# Patient Record
Sex: FEMALE | Race: BLACK OR AFRICAN AMERICAN | URBAN - METROPOLITAN AREA
[De-identification: names, ages, dates, MRNs, and addresses within clinical notes are randomized per-mention and may not be internally consistent; named-entity substitution may affect disease eponyms.]

---

## 2024-01-23 ENCOUNTER — OFFICE VISIT (OUTPATIENT)
Age: 44
End: 2024-01-23
Payer: COMMERCIAL

## 2024-01-23 VITALS
OXYGEN SATURATION: 97 % | HEART RATE: 82 BPM | DIASTOLIC BLOOD PRESSURE: 90 MMHG | WEIGHT: 228 LBS | TEMPERATURE: 98.2 F | BODY MASS INDEX: 34.56 KG/M2 | HEIGHT: 68 IN | RESPIRATION RATE: 18 BRPM | SYSTOLIC BLOOD PRESSURE: 145 MMHG

## 2024-01-23 DIAGNOSIS — I10 PRIMARY HYPERTENSION: ICD-10-CM

## 2024-01-23 DIAGNOSIS — Z76.89 ENCOUNTER TO ESTABLISH CARE: Primary | ICD-10-CM

## 2024-01-23 DIAGNOSIS — R35.0 URINARY FREQUENCY: ICD-10-CM

## 2024-01-23 DIAGNOSIS — Z12.31 BREAST CANCER SCREENING BY MAMMOGRAM: ICD-10-CM

## 2024-01-23 LAB
BILIRUBIN, URINE, POC: NEGATIVE
BLOOD URINE, POC: NORMAL
GLUCOSE URINE, POC: NEGATIVE
KETONES, URINE, POC: NEGATIVE
LEUKOCYTE ESTERASE, URINE, POC: NORMAL
NITRITE, URINE, POC: NEGATIVE
PH, URINE, POC: 5.5 (ref 4.6–8)
PROTEIN,URINE, POC: NEGATIVE
SPECIFIC GRAVITY, URINE, POC: 1.01 (ref 1–1.03)
URINALYSIS CLARITY, POC: NORMAL
URINALYSIS COLOR, POC: YELLOW
UROBILINOGEN, POC: NORMAL

## 2024-01-23 PROCEDURE — 3079F DIAST BP 80-89 MM HG: CPT | Performed by: STUDENT IN AN ORGANIZED HEALTH CARE EDUCATION/TRAINING PROGRAM

## 2024-01-23 PROCEDURE — 3077F SYST BP >= 140 MM HG: CPT | Performed by: STUDENT IN AN ORGANIZED HEALTH CARE EDUCATION/TRAINING PROGRAM

## 2024-01-23 PROCEDURE — 99204 OFFICE O/P NEW MOD 45 MIN: CPT

## 2024-01-23 PROCEDURE — 81003 URINALYSIS AUTO W/O SCOPE: CPT

## 2024-01-23 RX ORDER — AMLODIPINE BESYLATE AND BENAZEPRIL HYDROCHLORIDE 10; 20 MG/1; MG/1
1 CAPSULE ORAL DAILY
Qty: 30 CAPSULE | Refills: 3 | Status: SHIPPED | OUTPATIENT
Start: 2024-01-23

## 2024-01-23 RX ORDER — METOPROLOL SUCCINATE 50 MG/1
50 TABLET, EXTENDED RELEASE ORAL
COMMUNITY
Start: 2023-11-15 | End: 2024-02-13

## 2024-01-23 RX ORDER — AMLODIPINE BESYLATE AND BENAZEPRIL HYDROCHLORIDE 5; 10 MG/1; MG/1
1 CAPSULE ORAL 2 TIMES DAILY
COMMUNITY
Start: 2023-11-15 | End: 2024-01-23 | Stop reason: ALTCHOICE

## 2024-01-23 RX ORDER — OMEPRAZOLE 20 MG/1
40 CAPSULE, DELAYED RELEASE ORAL DAILY
COMMUNITY

## 2024-01-23 RX ORDER — TOPIRAMATE 50 MG/1
50 TABLET, FILM COATED ORAL
COMMUNITY
Start: 2023-11-15 | End: 2024-02-13

## 2024-01-23 SDOH — ECONOMIC STABILITY: FOOD INSECURITY: WITHIN THE PAST 12 MONTHS, YOU WORRIED THAT YOUR FOOD WOULD RUN OUT BEFORE YOU GOT MONEY TO BUY MORE.: NEVER TRUE

## 2024-01-23 SDOH — ECONOMIC STABILITY: FOOD INSECURITY: WITHIN THE PAST 12 MONTHS, THE FOOD YOU BOUGHT JUST DIDN'T LAST AND YOU DIDN'T HAVE MONEY TO GET MORE.: NEVER TRUE

## 2024-01-23 SDOH — ECONOMIC STABILITY: HOUSING INSECURITY
IN THE LAST 12 MONTHS, WAS THERE A TIME WHEN YOU DID NOT HAVE A STEADY PLACE TO SLEEP OR SLEPT IN A SHELTER (INCLUDING NOW)?: NO

## 2024-01-23 SDOH — ECONOMIC STABILITY: INCOME INSECURITY: HOW HARD IS IT FOR YOU TO PAY FOR THE VERY BASICS LIKE FOOD, HOUSING, MEDICAL CARE, AND HEATING?: NOT VERY HARD

## 2024-01-23 ASSESSMENT — PATIENT HEALTH QUESTIONNAIRE - PHQ9
SUM OF ALL RESPONSES TO PHQ QUESTIONS 1-9: 10
SUM OF ALL RESPONSES TO PHQ9 QUESTIONS 1 & 2: 3
SUM OF ALL RESPONSES TO PHQ QUESTIONS 1-9: 10
4. FEELING TIRED OR HAVING LITTLE ENERGY: 1
3. TROUBLE FALLING OR STAYING ASLEEP: 0
5. POOR APPETITE OR OVEREATING: 2
1. LITTLE INTEREST OR PLEASURE IN DOING THINGS: 2
2. FEELING DOWN, DEPRESSED OR HOPELESS: 1
SUM OF ALL RESPONSES TO PHQ QUESTIONS 1-9: 10
SUM OF ALL RESPONSES TO PHQ QUESTIONS 1-9: 9
6. FEELING BAD ABOUT YOURSELF - OR THAT YOU ARE A FAILURE OR HAVE LET YOURSELF OR YOUR FAMILY DOWN: 1
9. THOUGHTS THAT YOU WOULD BE BETTER OFF DEAD, OR OF HURTING YOURSELF: 1
10. IF YOU CHECKED OFF ANY PROBLEMS, HOW DIFFICULT HAVE THESE PROBLEMS MADE IT FOR YOU TO DO YOUR WORK, TAKE CARE OF THINGS AT HOME, OR GET ALONG WITH OTHER PEOPLE: 1
7. TROUBLE CONCENTRATING ON THINGS, SUCH AS READING THE NEWSPAPER OR WATCHING TELEVISION: 1
8. MOVING OR SPEAKING SO SLOWLY THAT OTHER PEOPLE COULD HAVE NOTICED. OR THE OPPOSITE, BEING SO FIGETY OR RESTLESS THAT YOU HAVE BEEN MOVING AROUND A LOT MORE THAN USUAL: 1

## 2024-01-23 ASSESSMENT — ANXIETY QUESTIONNAIRES
4. TROUBLE RELAXING: 2
GAD7 TOTAL SCORE: 13
3. WORRYING TOO MUCH ABOUT DIFFERENT THINGS: 2
1. FEELING NERVOUS, ANXIOUS, OR ON EDGE: 2
5. BEING SO RESTLESS THAT IT IS HARD TO SIT STILL: 1
6. BECOMING EASILY ANNOYED OR IRRITABLE: 2
2. NOT BEING ABLE TO STOP OR CONTROL WORRYING: 2
7. FEELING AFRAID AS IF SOMETHING AWFUL MIGHT HAPPEN: 2
IF YOU CHECKED OFF ANY PROBLEMS ON THIS QUESTIONNAIRE, HOW DIFFICULT HAVE THESE PROBLEMS MADE IT FOR YOU TO DO YOUR WORK, TAKE CARE OF THINGS AT HOME, OR GET ALONG WITH OTHER PEOPLE: SOMEWHAT DIFFICULT

## 2024-01-23 NOTE — PROGRESS NOTES
Patient has been identified by name and .    Chief Complaint   Patient presents with    New Patient     Estb with new PCP. Pt reports for HTN, possible UTI & a mole on her right shoulder -getting larger in size & raising.  Pt reports of frequent dark urination, lower ab pain for last few days.       Vitals:    24 1534 24 1544   BP: (!) 152/89 (!) 145/90   Site: Left Upper Arm Left Upper Arm   Position: Sitting Sitting   Cuff Size: Large Adult Large Adult   Pulse: 82    Resp: 18    Temp: 98.2 °F (36.8 °C)    TempSrc: Oral    SpO2: 97%    Weight: 103.4 kg (228 lb)    Height: 1.727 m (5' 8\")         1. Have you been to the ER, urgent care clinic since your last visit?  Hospitalized since your last visit?No    2. Have you seen or consulted any other health care providers outside of the [facility] since your last visit?  Include any pap smears or colon screening. No

## 2024-01-23 NOTE — PROGRESS NOTES
[address] [id number]   Office [phone], Fax [phone]    Subjective:   Jacqui Gonzalez is an 43 y.o. female who presents to establish care.   Currently doing well. Needs refill of BP medicine and is concerned she has a urine infection.   Patient was previously receiving care at: [facility]    #BP  Has cuff at home. Home BPs 130-140s/90-100s.   Not taking medications regularly, sometimes forgets morning dose. Can set an alarm in the morning. Both at night.  Drinking a lot of energy drinks and coffee.   Sometimes has headache/CP.     #Urinary frequency  States her urine has been darker. Taking azo, states that helps with abdominal discomfort that is occurring with urination. No burning with urination.   POC UA: Nitrites neg. Trace LE.     # Establish Care  Medical history significant for:  Hypertension, hx of hypertensive emergency  Depression/anxiety, not on medication currently  Surgical Hx: No  Family Hx: Father - HTN, Mother - HTN, MIx2, depression. Mat grandmother - breast cancer, Paternal GF - prostate cancer.    Diet: Cooking at home mostly. Chicken, some fried foods, fruits/veggies. Good water intake. 1 cup of coffee most days, drinking a lot of mountain dew, no energy drinks.   Exercise: walking lots. 10-15k steps a day.   Tobacco: None  Vape: tried before, but not currently  Alcohol: Occasional. Hard cider / beers multiple times a month. Sporadic.   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   Social Support: Sister lives near. Not many friends. Not really involved in any group activities.    GYN:   Thinks its been more than 2 years since last pap.   One previous mammogram, a few years ago.     Review of Systems   General/Constitutional:   No headache (chronic), fever, fatigue, weight loss or weight gain       Eyes:   No redness, pruritis, pain, visual changes, swelling, or discharge      Ears:    No pain, loss or changes

## 2024-01-31 NOTE — PROGRESS NOTES
[facility] Attending Attestation: While the patient was in clinic or immediately following the patient leaving the clinic, I reviewed the patient's medical history, the resident's findings on physical examination, and the patient's diagnosis and treatment plan with the resident and agree with the documentation in the note.     Eduardo Ramos MD

## 2024-02-22 ENCOUNTER — OFFICE VISIT (OUTPATIENT)
Age: 44
End: 2024-02-22

## 2024-02-22 ENCOUNTER — HOSPITAL ENCOUNTER (OUTPATIENT)
Facility: HOSPITAL | Age: 44
Setting detail: SPECIMEN
Discharge: HOME | End: 2024-02-22
Payer: COMMERCIAL

## 2024-02-22 VITALS
BODY MASS INDEX: 34.25 KG/M2 | DIASTOLIC BLOOD PRESSURE: 86 MMHG | OXYGEN SATURATION: 97 % | TEMPERATURE: 98.4 F | RESPIRATION RATE: 18 BRPM | WEIGHT: 226 LBS | SYSTOLIC BLOOD PRESSURE: 130 MMHG | HEART RATE: 75 BPM | HEIGHT: 68 IN

## 2024-02-22 DIAGNOSIS — Z11.59 NEED FOR HEPATITIS C SCREENING TEST: ICD-10-CM

## 2024-02-22 DIAGNOSIS — L91.8 CUTANEOUS TAG: ICD-10-CM

## 2024-02-22 DIAGNOSIS — E66.09 CLASS 1 OBESITY DUE TO EXCESS CALORIES WITHOUT SERIOUS COMORBIDITY WITH BODY MASS INDEX (BMI) OF 34.0 TO 34.9 IN ADULT: ICD-10-CM

## 2024-02-22 DIAGNOSIS — I10 PRIMARY HYPERTENSION: ICD-10-CM

## 2024-02-22 DIAGNOSIS — Z00.00 ENCOUNTER FOR WELL ADULT EXAM WITHOUT ABNORMAL FINDINGS: Primary | ICD-10-CM

## 2024-02-22 DIAGNOSIS — N92.6 MISSED MENSES: ICD-10-CM

## 2024-02-22 DIAGNOSIS — Z11.4 SCREENING FOR HIV WITHOUT PRESENCE OF RISK FACTORS: ICD-10-CM

## 2024-02-22 DIAGNOSIS — Z12.11 COLON CANCER SCREENING: ICD-10-CM

## 2024-02-22 DIAGNOSIS — Z12.4 CERVICAL CANCER SCREENING: ICD-10-CM

## 2024-02-22 LAB
HCG, PREGNANCY, URINE, POC: NEGATIVE
VALID INTERNAL CONTROL, POC: NORMAL

## 2024-02-22 PROCEDURE — 87661 TRICHOMONAS VAGINALIS AMPLIF: CPT

## 2024-02-22 PROCEDURE — 87624 HPV HI-RISK TYP POOLED RSLT: CPT

## 2024-02-22 PROCEDURE — 87491 CHLMYD TRACH DNA AMP PROBE: CPT

## 2024-02-22 PROCEDURE — 88175 CYTOPATH C/V AUTO FLUID REDO: CPT

## 2024-02-22 PROCEDURE — 87591 N.GONORRHOEAE DNA AMP PROB: CPT

## 2024-02-22 ASSESSMENT — PATIENT HEALTH QUESTIONNAIRE - PHQ9
SUM OF ALL RESPONSES TO PHQ QUESTIONS 1-9: 2
SUM OF ALL RESPONSES TO PHQ QUESTIONS 1-9: 2
2. FEELING DOWN, DEPRESSED OR HOPELESS: 1
SUM OF ALL RESPONSES TO PHQ QUESTIONS 1-9: 2
SUM OF ALL RESPONSES TO PHQ9 QUESTIONS 1 & 2: 2
1. LITTLE INTEREST OR PLEASURE IN DOING THINGS: 1
SUM OF ALL RESPONSES TO PHQ QUESTIONS 1-9: 2

## 2024-02-22 ASSESSMENT — ENCOUNTER SYMPTOMS
ABDOMINAL PAIN: 0
NAUSEA: 0
COUGH: 0
RHINORRHEA: 0
CHEST TIGHTNESS: 0
VOMITING: 0
SORE THROAT: 0
COLOR CHANGE: 0
SHORTNESS OF BREATH: 0
DIARRHEA: 0
BLOOD IN STOOL: 0
WHEEZING: 0

## 2024-02-22 NOTE — PROGRESS NOTES
Patient has been identified by name and .    Chief Complaint   Patient presents with    Annual Exam     Pt reports for a CPE & Pap. Pt reports for a mole on her upper right back proximal to shoulder that was flat but grown in a year, changed color. Bilateral Athlete's feet       Vitals:    24 1301   BP: 130/86   Site: Right Upper Arm   Position: Sitting   Cuff Size: Medium Adult   Pulse: 75   Resp: 18   Temp: 98.4 °F (36.9 °C)   TempSrc: Oral   SpO2: 97%   Weight: 102.5 kg (226 lb)   Height: 1.727 m (5' 8\")        \"Have you been to the ER, urgent care clinic since your last visit?  Hospitalized since your last visit?\"    NO    “Have you seen or consulted any other health care providers outside of [facility] since your last visit?”    NO    “Have you had a colorectal cancer screening such as a colonoscopy/FIT/Cologuard?    NO      “Have you had a pap smear?”    NO

## 2024-02-22 NOTE — PROGRESS NOTES
[address] [place] [address]   Office [phone], Fax [phone]    Subjective:   Jacqui Gonzalez is an 43 y.o. female who presents for complete physical exam.  Doing well. Having the following concerns.    # Concerned about a mole in her back  R upper back; Has been growing for the past year, used to be flat but now is rounded and is catching on her clothes. It is not painful.     #Hypertension  Changed blood pressure medication as advised last visit from BID to once in the evening and is not forgeting her medication as before. Now taking amlodipine-benazepril 10-20mg. BP better controlled. In office, BP today is 130/86.    - Update vaccines, she does not have records with her. Will await from previous PCP.  - Last mammogram more than 5 years ago; Grandmother family history. Referral sent at last visit. Recommend annual screen.   - No previous colonoscopy  - Needs pap today, last unknown    Diet: Doing well. Veggies. Proteins. Good water intake.   Exercise: Walking / running. Trying to start lifting weights.  Tobacco use: None  Alcohol use: Socially  Sexually active: with one partner, . No concerns. Not on birth control. Normally has regular periods. Missed period this past month. January 2nd was last period.    Health Maintenance   Topic Date Due    Hepatitis B vaccine (1 of 3 - 3-dose series) Never done    COVID-19 Vaccine (1) Never done    HIV screen  Never done    Hepatitis C screen  Never done    DTaP/Tdap/Td vaccine (1 - Tdap) Never done    Cervical cancer screen  Never done    Diabetes screen  Never done    Lipids  Never done    Flu vaccine (1) Never done    Colorectal Cancer Screen  Never done    Depression Monitoring  01/23/2025    Hepatitis A vaccine  Aged Out    Hib vaccine  Aged Out    HPV vaccine  Aged Out    Polio vaccine  Aged Out    Meningococcal (ACWY) vaccine  Aged Out    Pneumococcal 0-64 years Vaccine  Aged Out    Depression Screen  Discontinued

## 2024-02-23 LAB
ALBUMIN SERPL-MCNC: 3.8 G/DL (ref 3.5–5)
ALBUMIN/GLOB SERPL: 1 (ref 1.1–2.2)
ALP SERPL-CCNC: 73 U/L (ref 45–117)
ALT SERPL-CCNC: 13 U/L (ref 12–78)
ANION GAP SERPL CALC-SCNC: ABNORMAL MMOL/L (ref 5–15)
AST SERPL-CCNC: 12 U/L (ref 15–37)
BILIRUB SERPL-MCNC: 0.6 MG/DL (ref 0.2–1)
BUN SERPL-MCNC: 14 MG/DL (ref 6–20)
BUN/CREAT SERPL: 15 (ref 12–20)
CALCIUM SERPL-MCNC: 9.4 MG/DL (ref 8.5–10.1)
CHLORIDE SERPL-SCNC: 108 MMOL/L (ref 97–108)
CHOLEST SERPL-MCNC: 183 MG/DL
CO2 SERPL-SCNC: 31 MMOL/L (ref 21–32)
CREAT SERPL-MCNC: 0.96 MG/DL (ref 0.55–1.02)
ERYTHROCYTE [DISTWIDTH] IN BLOOD BY AUTOMATED COUNT: 18.2 % (ref 11.5–14.5)
EST. AVERAGE GLUCOSE BLD GHB EST-MCNC: 103 MG/DL
GLOBULIN SER CALC-MCNC: 3.9 G/DL (ref 2–4)
GLUCOSE SERPL-MCNC: 84 MG/DL (ref 65–100)
HBA1C MFR BLD: 5.2 % (ref 4–5.6)
HCT VFR BLD AUTO: 36.4 % (ref 35–47)
HCV AB SER IA-ACNC: 0.14 INDEX
HCV AB SERPL QL IA: NONREACTIVE
HDLC SERPL-MCNC: 55 MG/DL
HDLC SERPL: 3.3 (ref 0–5)
HGB BLD-MCNC: 11.4 G/DL (ref 11.5–16)
HIV 1+2 AB+HIV1 P24 AG SERPL QL IA: NONREACTIVE
HIV 1/2 RESULT COMMENT: NORMAL
LDLC SERPL CALC-MCNC: 105.6 MG/DL (ref 0–100)
MCH RBC QN AUTO: 23.6 PG (ref 26–34)
MCHC RBC AUTO-ENTMCNC: 31.3 G/DL (ref 30–36.5)
MCV RBC AUTO: 75.4 FL (ref 80–99)
NRBC # BLD: 0 K/UL (ref 0–0.01)
NRBC BLD-RTO: 0 PER 100 WBC
PLATELET # BLD AUTO: 296 K/UL (ref 150–400)
PMV BLD AUTO: 10.9 FL (ref 8.9–12.9)
POTASSIUM SERPL-SCNC: 4 MMOL/L (ref 3.5–5.1)
PROT SERPL-MCNC: 7.7 G/DL (ref 6.4–8.2)
RBC # BLD AUTO: 4.83 M/UL (ref 3.8–5.2)
SODIUM SERPL-SCNC: 138 MMOL/L (ref 136–145)
TRIGL SERPL-MCNC: 112 MG/DL
VLDLC SERPL CALC-MCNC: 22.4 MG/DL
WBC # BLD AUTO: 6.9 K/UL (ref 3.6–11)

## 2024-02-26 LAB
C TRACH RRNA SPEC QL NAA+PROBE: NEGATIVE
N GONORRHOEA RRNA SPEC QL NAA+PROBE: NEGATIVE
SPECIMEN SOURCE: NORMAL
T VAGINALIS RRNA SPEC QL NAA+PROBE: NEGATIVE

## 2024-03-01 ENCOUNTER — NURSE ONLY (OUTPATIENT)
Age: 44
End: 2024-03-01

## 2024-03-01 DIAGNOSIS — Z00.00 ENCOUNTER FOR WELL ADULT EXAM WITHOUT ABNORMAL FINDINGS: ICD-10-CM

## 2024-03-02 LAB
FERRITIN SERPL-MCNC: 8 NG/ML (ref 26–388)
IRON SATN MFR SERPL: 7 % (ref 20–50)
IRON SERPL-MCNC: 27 UG/DL (ref 35–150)
TIBC SERPL-MCNC: 405 UG/DL (ref 250–450)

## 2024-03-05 ENCOUNTER — TELEPHONE (OUTPATIENT)
Age: 44
End: 2024-03-05

## 2024-03-05 DIAGNOSIS — D50.9 IRON DEFICIENCY ANEMIA, UNSPECIFIED IRON DEFICIENCY ANEMIA TYPE: Primary | ICD-10-CM

## 2024-03-05 RX ORDER — FERROUS SULFATE 325(65) MG
325 TABLET ORAL EVERY OTHER DAY
Qty: 90 TABLET | Refills: 1 | Status: SHIPPED | OUTPATIENT
Start: 2024-03-05

## 2024-03-05 NOTE — TELEPHONE ENCOUNTER
Called patient ~5:40PM. Confirmed patient by name and date of birth. Explained recent labs that are consistent with iron deficiency anemia. Hgb 11.4. Fe 27. Ferritin 8. Discussed incorporating more foods high in iron (including leafy greens, veggies, meats and other proteins) into the diet. Will also start on iron supplementation with iron sulfate 325mg every other day with breakfast. Advised on potential side effects including constipation.

## 2024-05-06 NOTE — TELEPHONE ENCOUNTER
Hi Dr. Infante,    Patient is completed out of medication and requesting a refill.    metoprolol succinate (TOPROL XL) 50 MG extended release tablet     Please send to:    [facility]/pharmacy #[id number] - [place], [place] - [address] - P [phone] - F [phone]  [address] [place] [address]  Phone: [phone]  Fax: [phone]

## 2024-05-06 NOTE — TELEPHONE ENCOUNTER
Medication Refill Request    Jacqui Gonzalez is requesting a refill of the following medication(s):   Requested Prescriptions     Pending Prescriptions Disp Refills    metoprolol succinate (TOPROL XL) 50 MG extended release tablet 30 tablet      Sig: Take 1 tablet by mouth        Listed PCP is Silvia Montgomery MD   Last provider to prescribe medication: Historical  Last Date of Medication Prescribed: 11/15/2023   Date of Last Office Visit at [facility]: 02/22/2024     FUTURE APPOINTMENT: No future appointments.    Please send refill to:    [facility]/pharmacy #[id number] - [place], [place] - [address] - P [phone] - F [phone]  [address] [place] [address]  Phone: [phone] Fax: [phone]      Please review request and approve or deny with recommendations.

## 2024-05-09 RX ORDER — METOPROLOL SUCCINATE 50 MG/1
50 TABLET, EXTENDED RELEASE ORAL DAILY
Qty: 30 TABLET | Refills: 0 | Status: SHIPPED | OUTPATIENT
Start: 2024-05-09 | End: 2024-06-08

## 2024-05-09 NOTE — TELEPHONE ENCOUNTER
Pt requests refill of metoprolol 50mg daily which she has been on for years, prescribed by previous PCP. Pt believes it is for treatment of her blood pressure but is unsure. Reports chest pain and palpitations in the past and seen by cardiology. Per pt, stress test was normal. Pt reports chest pain intermittently occurring for the past week while she has been off the medication. I recommended evaluation in the ER if chest pain occurs. I will send 1 month supply of this medication as she has been on it chronically and encouraged follow up in clinic in the next month.    Silvia Infante MD  05/09/24

## 2024-08-21 DIAGNOSIS — I10 PRIMARY HYPERTENSION: ICD-10-CM

## 2024-08-22 NOTE — TELEPHONE ENCOUNTER
Medication Refill Request    Jacqui Gonzalez is requesting a refill of the following medication(s):   Requested Prescriptions     Pending Prescriptions Disp Refills    amLODIPine-benazepril (LOTREL) 10-20 MG per capsule 30 capsule 3     Sig: Take 1 capsule by mouth daily        Listed PCP is Silvia Montgomery MD   Last provider to prescribe medication: Rachel  Last Date of Medication Prescribed: 1/23/2024   Date of Last Office Visit at [facility]: 1/23/2024   FUTURE APPOINTMENT: No future appointments.    Please send refill to:    [facility]/pharmacy #[id number] - [place], [place] - [id number] [place] [address] - P [phone] - F [phone]  [address] [place] [address]  Phone: [phone] Fax: [phone]      Please review request and approve or deny with recommendations.

## 2024-08-26 RX ORDER — AMLODIPINE AND BENAZEPRIL HYDROCHLORIDE 10; 20 MG/1; MG/1
1 CAPSULE ORAL DAILY
Qty: 30 CAPSULE | Refills: 3 | Status: SHIPPED | OUTPATIENT
Start: 2024-08-26

## 2024-11-05 ENCOUNTER — TELEPHONE (OUTPATIENT)
Age: 44
End: 2024-11-05

## 2025-01-29 ENCOUNTER — OFFICE VISIT (OUTPATIENT)
Age: 45
End: 2025-01-29
Payer: COMMERCIAL

## 2025-01-29 VITALS
TEMPERATURE: 97.6 F | DIASTOLIC BLOOD PRESSURE: 79 MMHG | HEART RATE: 89 BPM | RESPIRATION RATE: 18 BRPM | WEIGHT: 237 LBS | SYSTOLIC BLOOD PRESSURE: 128 MMHG | HEIGHT: 68 IN | BODY MASS INDEX: 35.92 KG/M2 | OXYGEN SATURATION: 97 %

## 2025-01-29 DIAGNOSIS — I10 PRIMARY HYPERTENSION: ICD-10-CM

## 2025-01-29 DIAGNOSIS — F41.9 SEVERE ANXIETY: Primary | ICD-10-CM

## 2025-01-29 DIAGNOSIS — F32.A MILD DEPRESSION: ICD-10-CM

## 2025-01-29 DIAGNOSIS — Z12.31 BREAST CANCER SCREENING BY MAMMOGRAM: ICD-10-CM

## 2025-01-29 DIAGNOSIS — Z12.11 COLON CANCER SCREENING: ICD-10-CM

## 2025-01-29 PROCEDURE — 3074F SYST BP LT 130 MM HG: CPT

## 2025-01-29 PROCEDURE — 3078F DIAST BP <80 MM HG: CPT

## 2025-01-29 PROCEDURE — 99214 OFFICE O/P EST MOD 30 MIN: CPT

## 2025-01-29 RX ORDER — SERTRALINE HYDROCHLORIDE 25 MG/1
25 TABLET, FILM COATED ORAL DAILY
Qty: 30 TABLET | Refills: 0 | Status: SHIPPED | OUTPATIENT
Start: 2025-01-29

## 2025-01-29 ASSESSMENT — PATIENT HEALTH QUESTIONNAIRE - PHQ9
3. TROUBLE FALLING OR STAYING ASLEEP: MORE THAN HALF THE DAYS
SUM OF ALL RESPONSES TO PHQ QUESTIONS 1-9: 8
8. MOVING OR SPEAKING SO SLOWLY THAT OTHER PEOPLE COULD HAVE NOTICED. OR THE OPPOSITE, BEING SO FIGETY OR RESTLESS THAT YOU HAVE BEEN MOVING AROUND A LOT MORE THAN USUAL: NOT AT ALL
10. IF YOU CHECKED OFF ANY PROBLEMS, HOW DIFFICULT HAVE THESE PROBLEMS MADE IT FOR YOU TO DO YOUR WORK, TAKE CARE OF THINGS AT HOME, OR GET ALONG WITH OTHER PEOPLE: SOMEWHAT DIFFICULT
SUM OF ALL RESPONSES TO PHQ QUESTIONS 1-9: 8
5. POOR APPETITE OR OVEREATING: NOT AT ALL
6. FEELING BAD ABOUT YOURSELF - OR THAT YOU ARE A FAILURE OR HAVE LET YOURSELF OR YOUR FAMILY DOWN: SEVERAL DAYS
9. THOUGHTS THAT YOU WOULD BE BETTER OFF DEAD, OR OF HURTING YOURSELF: NOT AT ALL
4. FEELING TIRED OR HAVING LITTLE ENERGY: SEVERAL DAYS
SUM OF ALL RESPONSES TO PHQ9 QUESTIONS 1 & 2: 2
SUM OF ALL RESPONSES TO PHQ QUESTIONS 1-9: 8
1. LITTLE INTEREST OR PLEASURE IN DOING THINGS: SEVERAL DAYS
7. TROUBLE CONCENTRATING ON THINGS, SUCH AS READING THE NEWSPAPER OR WATCHING TELEVISION: MORE THAN HALF THE DAYS
2. FEELING DOWN, DEPRESSED OR HOPELESS: SEVERAL DAYS
SUM OF ALL RESPONSES TO PHQ QUESTIONS 1-9: 8

## 2025-01-29 ASSESSMENT — ANXIETY QUESTIONNAIRES
GAD7 TOTAL SCORE: 15
4. TROUBLE RELAXING: MORE THAN HALF THE DAYS
2. NOT BEING ABLE TO STOP OR CONTROL WORRYING: MORE THAN HALF THE DAYS
6. BECOMING EASILY ANNOYED OR IRRITABLE: MORE THAN HALF THE DAYS
3. WORRYING TOO MUCH ABOUT DIFFERENT THINGS: MORE THAN HALF THE DAYS
7. FEELING AFRAID AS IF SOMETHING AWFUL MIGHT HAPPEN: NEARLY EVERY DAY
1. FEELING NERVOUS, ANXIOUS, OR ON EDGE: NEARLY EVERY DAY
IF YOU CHECKED OFF ANY PROBLEMS ON THIS QUESTIONNAIRE, HOW DIFFICULT HAVE THESE PROBLEMS MADE IT FOR YOU TO DO YOUR WORK, TAKE CARE OF THINGS AT HOME, OR GET ALONG WITH OTHER PEOPLE: SOMEWHAT DIFFICULT
5. BEING SO RESTLESS THAT IT IS HARD TO SIT STILL: SEVERAL DAYS

## 2025-01-29 NOTE — PROGRESS NOTES
Patient has been identified by name and .    Chief Complaint   Patient presents with    Anxiety     Pt reports with anxiety issues, building over couple of months        Vitals:    25 1729 25 1734   BP: (!) 144/88 128/79   Site: Left Upper Arm Right Upper Arm   Position: Sitting Sitting   Cuff Size: Large Adult Large Adult   Pulse: 93 89   Resp: 18    Temp: 97.6 °F (36.4 °C)    TempSrc: Oral    SpO2: 97%    Weight: 107.5 kg (237 lb)    Height: 1.727 m (5' 8\")         \"Have you been to the ER, urgent care clinic since your last visit?  Hospitalized since your last visit?\"    NO    “Have you seen or consulted any other health care providers outside of [facility] since your last visit?”    NO    “Have you had a colorectal cancer screening such as a colonoscopy/FIT/Cologuard?    NO    No colonoscopy on file  No cologuard on file  No FIT/FOBT on file   No flexible sigmoidoscopy on file        Have you had a mammogram?”   NO    No breast cancer screening on file

## 2025-01-29 NOTE — PROGRESS NOTES
[address] [place] [address]   Office [phone], Fax [phone]    Subjective   Jacqui Gonzalez is a 44 y.o. 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 No SOB/chest pain/palpitations/flushing/tremor.  Worried - constantly worrying about her sons (calling all the time), worried something bad will happen, her work place, no longer calm   No tobacco use. Occasional alcohol use - one beer or hard cider, 1-2x / month  Previously was on an antidepressant, in 2010 after her dad passed away. Discontinued after 1 year.  Supports: very little. Mother / sister, but they're often busy. Doesn't talk with  about this.    Denies feeling distractible, impulsive, flight of ideas, increased activity/speech.    #HTN  BP today: 128/79  Taking amlodipine-benazepril 10-20mg daily.  Doing well.   No headache, dizziness, visual changes, chest pain, SOB, N/V, abdominal pain    Medical History  Past Medical History:   Diagnosis Date    Anxiety     Hypertension        Medications  Current Outpatient Medications   Medication Sig    amLODIPine-benazepril (LOTREL) 10-20 MG per capsule Take 1 capsule by mouth daily    metoprolol succinate (TOPROL XL) 50 MG extended release tablet Take 1 tablet by mouth daily (Patient not taking: Reported on 1/29/2025)    ferrous sulfate (IRON 325) 325 (65 Fe) MG tablet Take 1 tablet by mouth every other day with breakfast (Patient not taking: Reported on 1/29/2025)    topiramate (TOPAMAX) 50 MG tablet Take 1 tablet by mouth (Patient not taking: Reported on 1/29/2025)    omeprazole (PRILOSEC) 20

## 2025-01-29 NOTE — PATIENT INSTRUCTIONS
Advised to make the following lifestyle modifications:  - Diet: Increase daily intake of vegetables, fruit, and seeds/nuts. Eat fish 2-3 times per week. Avoid sugary snacks and drinks. Eat at regular intervals and do not skip meals. Avoid salty dinners.   - Exercise: Walk at least 1 hour per day at a pace that is comfortable for you. Stretch your muscles.  - Light exposure: Spend time in the sun / bright area. Take precautions to avoid sunburn - early hours of the day are best.   - Sleep: Have a set bedtime and wake up time. Stay consistent. Get at least 8 hours of sleep. Avoid caffeine in afternoon. Lavender and camomile can help with sleep. Have a bedtime routine - this may include warm shower, brushing teeth, stretching, listening to music, preparing for the next day, prayer/meditation/reflection, etc. Do the same thing, in the same order every night. Sleep in quiet, dark, cool room. Avoid TV/phone screens for 30mins prior to sleep. Do not nap during the day. Only use bed for sleep - do not do work in bed.       Are you having trouble sleeping?    Check out these TIPs for getting a better nights sleep        https://youtu.be/aPLLurcUF8T      24 Hour National Suicide Hotline  1-792.546.2847    [facility] Board:  24 hour crisis line: 103.143.3878  Daytime number: [phone]  Psychiatry, counseling, case management, drug/alcohol addiction    [facility]  Intake: [phone]  Emergency: [phone]    [facility]    [facility]  [address] [place] [address]  Phone: [phone]     [facility] (Dr. Olguin): [email or web address]  [address], [place] [address]  Phone: [phone]  Fax: [phone]  Office Hours:  Mon: 10:00 am to 4:00 pm  Tue: 8:00 am to 6:00 pm  Wed-Thur: 8:00 am to 7:00 pm     [facility]: [email or web address]  [place]

## 2025-02-17 ENCOUNTER — OFFICE VISIT (OUTPATIENT)
Age: 45
End: 2025-02-17
Payer: COMMERCIAL

## 2025-02-17 VITALS
HEIGHT: 68 IN | HEART RATE: 85 BPM | TEMPERATURE: 98.2 F | OXYGEN SATURATION: 96 % | SYSTOLIC BLOOD PRESSURE: 112 MMHG | RESPIRATION RATE: 20 BRPM | WEIGHT: 229.6 LBS | DIASTOLIC BLOOD PRESSURE: 77 MMHG | BODY MASS INDEX: 34.8 KG/M2

## 2025-02-17 DIAGNOSIS — Z12.11 SCREENING FOR COLON CANCER: ICD-10-CM

## 2025-02-17 DIAGNOSIS — Z12.31 ENCOUNTER FOR SCREENING MAMMOGRAM FOR MALIGNANT NEOPLASM OF BREAST: ICD-10-CM

## 2025-02-17 DIAGNOSIS — F41.1 GAD (GENERALIZED ANXIETY DISORDER): Primary | ICD-10-CM

## 2025-02-17 PROCEDURE — 99213 OFFICE O/P EST LOW 20 MIN: CPT

## 2025-02-17 RX ORDER — SERTRALINE HYDROCHLORIDE 25 MG/1
25 TABLET, FILM COATED ORAL DAILY
Qty: 30 TABLET | Refills: 2 | Status: SHIPPED | OUTPATIENT
Start: 2025-02-17

## 2025-02-17 SDOH — ECONOMIC STABILITY: TRANSPORTATION INSECURITY
IN THE PAST 12 MONTHS, HAS THE LACK OF TRANSPORTATION KEPT YOU FROM MEDICAL APPOINTMENTS OR FROM GETTING MEDICATIONS?: NO

## 2025-02-17 SDOH — ECONOMIC STABILITY: TRANSPORTATION INSECURITY
IN THE PAST 12 MONTHS, HAS LACK OF TRANSPORTATION KEPT YOU FROM MEETINGS, WORK, OR FROM GETTING THINGS NEEDED FOR DAILY LIVING?: NO

## 2025-02-17 SDOH — ECONOMIC STABILITY: INCOME INSECURITY: IN THE LAST 12 MONTHS, WAS THERE A TIME WHEN YOU WERE NOT ABLE TO PAY THE MORTGAGE OR RENT ON TIME?: NO

## 2025-02-17 SDOH — ECONOMIC STABILITY: FOOD INSECURITY: WITHIN THE PAST 12 MONTHS, THE FOOD YOU BOUGHT JUST DIDN'T LAST AND YOU DIDN'T HAVE MONEY TO GET MORE.: NEVER TRUE

## 2025-02-17 SDOH — ECONOMIC STABILITY: FOOD INSECURITY: WITHIN THE PAST 12 MONTHS, YOU WORRIED THAT YOUR FOOD WOULD RUN OUT BEFORE YOU GOT MONEY TO BUY MORE.: NEVER TRUE

## 2025-02-17 ASSESSMENT — ANXIETY QUESTIONNAIRES
7. FEELING AFRAID AS IF SOMETHING AWFUL MIGHT HAPPEN: NOT AT ALL
4. TROUBLE RELAXING: NOT AT ALL
IF YOU CHECKED OFF ANY PROBLEMS ON THIS QUESTIONNAIRE, HOW DIFFICULT HAVE THESE PROBLEMS MADE IT FOR YOU TO DO YOUR WORK, TAKE CARE OF THINGS AT HOME, OR GET ALONG WITH OTHER PEOPLE: NOT DIFFICULT AT ALL
1. FEELING NERVOUS, ANXIOUS, OR ON EDGE: NOT AT ALL
GAD7 TOTAL SCORE: 1
5. BEING SO RESTLESS THAT IT IS HARD TO SIT STILL: NOT AT ALL
6. BECOMING EASILY ANNOYED OR IRRITABLE: SEVERAL DAYS
3. WORRYING TOO MUCH ABOUT DIFFERENT THINGS: NOT AT ALL
2. NOT BEING ABLE TO STOP OR CONTROL WORRYING: NOT AT ALL

## 2025-02-17 ASSESSMENT — PATIENT HEALTH QUESTIONNAIRE - PHQ9
7. TROUBLE CONCENTRATING ON THINGS, SUCH AS READING THE NEWSPAPER OR WATCHING TELEVISION: SEVERAL DAYS
SUM OF ALL RESPONSES TO PHQ QUESTIONS 1-9: 2
1. LITTLE INTEREST OR PLEASURE IN DOING THINGS: NOT AT ALL
9. THOUGHTS THAT YOU WOULD BE BETTER OFF DEAD, OR OF HURTING YOURSELF: NOT AT ALL
8. MOVING OR SPEAKING SO SLOWLY THAT OTHER PEOPLE COULD HAVE NOTICED. OR THE OPPOSITE, BEING SO FIGETY OR RESTLESS THAT YOU HAVE BEEN MOVING AROUND A LOT MORE THAN USUAL: NOT AT ALL
SUM OF ALL RESPONSES TO PHQ QUESTIONS 1-9: 2
SUM OF ALL RESPONSES TO PHQ QUESTIONS 1-9: 2
4. FEELING TIRED OR HAVING LITTLE ENERGY: NOT AT ALL
6. FEELING BAD ABOUT YOURSELF - OR THAT YOU ARE A FAILURE OR HAVE LET YOURSELF OR YOUR FAMILY DOWN: NOT AT ALL
2. FEELING DOWN, DEPRESSED OR HOPELESS: NOT AT ALL
3. TROUBLE FALLING OR STAYING ASLEEP: SEVERAL DAYS
SUM OF ALL RESPONSES TO PHQ QUESTIONS 1-9: 2
10. IF YOU CHECKED OFF ANY PROBLEMS, HOW DIFFICULT HAVE THESE PROBLEMS MADE IT FOR YOU TO DO YOUR WORK, TAKE CARE OF THINGS AT HOME, OR GET ALONG WITH OTHER PEOPLE: NOT DIFFICULT AT ALL
5. POOR APPETITE OR OVEREATING: NOT AT ALL
SUM OF ALL RESPONSES TO PHQ9 QUESTIONS 1 & 2: 0

## 2025-02-17 ASSESSMENT — ENCOUNTER SYMPTOMS
NAUSEA: 0
VOMITING: 0
DIARRHEA: 0

## 2025-02-17 NOTE — PROGRESS NOTES
[address] [place] [address]   Office [phone], Fax [phone]    Subjective   Jacqui Gonzalez is a 44 y.o. female who presents for Depression and Anxiety    #Anxiety follow up  - started on sertraline 25mg four weeks ago  - side effect of dry mouth but otherwise no issues; no GI/neuro sx.  - doesn't feel like much has changed in her life in terms of stressors  - stressors: work/hiring, family, news, recent storms; same as before but she is not worrying as much  - unsure what coping mechanisms have been helping  - has been eating more protein and fewer carbs  - listening to ebooks  - going out to eat with family, spending more time with kids  - not getting to the gym much due to work  - ANTHONY-7 score 1, PHQ-9 score 2  - No SI/HI  - [facility] appointment tomorrow    #low iron  - not taking supplement   - is taking a multivitamin but unsure how much iron in this  - open to increasing in diet    #allergies  - uses claritin typically, not using at this time  - also uses flonase     #HM  - CSY  - mammo  - flu, tdap; declined today    Review of Systems   Review of Systems   Constitutional:  Negative for activity change and appetite change.   Eyes:  Negative for visual disturbance.   Cardiovascular:  Negative for chest pain and palpitations.   Gastrointestinal:  Negative for diarrhea, nausea and vomiting.   Skin:  Negative for rash.   Neurological:  Negative for dizziness, syncope, weakness, light-headedness and numbness.   Psychiatric/Behavioral:  Negative for agitation, dysphoric mood, sleep disturbance and suicidal ideas. The patient is not nervous/anxious and is not hyperactive.       Medical History  Past Medical History:   Diagnosis Date    Anxiety     Hypertension        Medications  Current Outpatient Medications   Medication Sig    sertraline (ZOLOFT) 25 MG tablet Take 1 tablet by mouth daily    amLODIPine-benazepril (LOTREL) 10-20 MG per capsule Take 1 capsule by mouth daily     No current  Clothing

## 2025-02-17 NOTE — PROGRESS NOTES
Identified pt with two pt identifiers(name and ). Reviewed record in preparation for visit and have obtained necessary documentation.  Chief Complaint   Patient presents with    Depression    Anxiety        Health Maintenance Due   Topic    Hepatitis B vaccine (1 of 3 - 19+ 3-dose series)    DTaP/Tdap/Td vaccine (1 - Tdap)    Breast cancer screen     Colorectal Cancer Screen     Flu vaccine (1)    COVID-19 Vaccine (3 - 2024-25 season)       Vitals:    25 0834   BP: 112/77   Site: Right Upper Arm   Position: Sitting   Cuff Size: Large Adult   Pulse: 85   Resp: 20   Temp: 98.2 °F (36.8 °C)   TempSrc: Oral   SpO2: 96%   Weight: 104.1 kg (229 lb 9.6 oz)   Height: 1.727 m (5' 8\")         \"Have you been to the ER, urgent care clinic since your last visit?  Hospitalized since your last visit?\"    NO    “Have you seen or consulted any other health care providers outside of [facility] since your last visit?”    NO    Have you had a mammogram?”   NO    No breast cancer screening on file         “Have you had a colorectal cancer screening such as a colonoscopy/FIT/Cologuard?    NO    No colonoscopy on file  No cologuard on file  No FIT/FOBT on file   No flexible sigmoidoscopy on file         Click Here for Release of Records Request     This patient is accompanied in the office by her self.  I have received verbal consent from Jacqui Gonzalez to discuss any/all medical information while they are present in the room.

## 2025-02-18 ENCOUNTER — OFFICE VISIT (OUTPATIENT)
Age: 45
End: 2025-02-18
Payer: COMMERCIAL

## 2025-02-18 DIAGNOSIS — F43.22 ADJUSTMENT DISORDER WITH ANXIOUS MOOD: ICD-10-CM

## 2025-02-18 DIAGNOSIS — Z71.9 ENCOUNTER FOR COUNSELING: ICD-10-CM

## 2025-02-18 DIAGNOSIS — F41.1 GAD (GENERALIZED ANXIETY DISORDER): Primary | ICD-10-CM

## 2025-02-18 PROCEDURE — 99213 OFFICE O/P EST LOW 20 MIN: CPT

## 2025-02-18 NOTE — PROGRESS NOTES
[facility] Attending Attestation: While the patient was in clinic or immediately following the patient leaving the clinic, I reviewed the patient's medical history, the resident's findings on physical examination, and the patient's diagnosis and treatment plan with the resident and agree with the documentation in the note.     Eduardo Ramos MD

## 2025-02-18 NOTE — PROGRESS NOTES
[address] [id number]   Office [phone], Fax [phone]     Jacqui Gonzalez is a 44 y.o. female who presents to the [facility] for counseling. Referred by Dr. Infante.     -Mental health history: anxiety     -HPI: Pt has a history of anxiety. Increased stressors as below. Feeling lots of somatic sxs-heart pounding, stomach upset, etc. Reports feels \"stuck\" at times because of anxiety. Feeling more anger recently.    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 Getting called and having to do work even on days off. Feeling burned out.    -Strategies pt has tried in an attempt to control stress:   - Reports she tries to \"be quiet\" when she feels angry.  - Making a to-do list  - talks to mom, good support for her    -Medications for anxiety and depression: zoloft 25mg    Current Outpatient Medications   Medication Sig Dispense Refill    sertraline (ZOLOFT) 25 MG tablet Take 1 tablet by mouth daily 30 tablet 2    amLODIPine-benazepril (LOTREL) 10-20 MG per capsule Take 1 capsule by mouth daily 30 capsule 3     No current facility-administered medications for this visit.     No Known Allergies  Past Medical History:   Diagnosis Date    Anxiety     Hypertension      Family History   Problem Relation Age of Onset    Heart Attack Mother     Hypertension Mother     Depression Mother     Lung Disease Father     Hypertension Father     High Blood Pressure Father     Breast Cancer Maternal Grandmother      Social History     Socioeconomic History    Marital status: Single     Spouse name: Not on file    Number of children: Not on file    Years of education: Not on file    Highest education level: Not on file   Occupational History    Not on file   Tobacco Use    Smoking status: Never    Smokeless tobacco: Never   Vaping Use    Vaping status: Never Used   Substance and Sexual Activity    Alcohol use: Yes     Alcohol/week: 1.0 standard

## 2025-03-12 DIAGNOSIS — I10 PRIMARY HYPERTENSION: ICD-10-CM

## 2025-03-12 NOTE — TELEPHONE ENCOUNTER
Medication Refill Request    Jacqui Gonzalez is requesting a refill of the following medication(s):   Requested Prescriptions     Pending Prescriptions Disp Refills    amLODIPine-benazepril (LOTREL) 10-20 MG per capsule 30 capsule 3     Sig: Take 1 capsule by mouth daily        Listed PCP is Silvia Montgomery MD   Last provider to prescribe medication: Dr. Infante  Last Date of Medication Prescribed: 08/26/24   Date of Last Office Visit at [facility]: 02/18/25   FUTURE APPOINTMENT:   Future Appointments   Date Time Provider Department Center   5/15/2025  8:00 AM Silvia Montgomery MD [facility] DEP       Please send refill to:    [facility]/pharmacy #[id number] - [place], [place] - [address] - P [phone] - F [phone]  [address] [place] [address]  Phone: [phone] Fax: [phone]      Please review request and approve or deny with recommendations.

## 2025-03-13 RX ORDER — AMLODIPINE AND BENAZEPRIL HYDROCHLORIDE 10; 20 MG/1; MG/1
1 CAPSULE ORAL DAILY
Qty: 30 CAPSULE | Refills: 3 | Status: SHIPPED | OUTPATIENT
Start: 2025-03-13

## 2025-04-30 ENCOUNTER — OFFICE VISIT (OUTPATIENT)
Age: 45
End: 2025-04-30

## 2025-04-30 VITALS
DIASTOLIC BLOOD PRESSURE: 74 MMHG | HEART RATE: 78 BPM | HEIGHT: 68 IN | OXYGEN SATURATION: 95 % | RESPIRATION RATE: 18 BRPM | BODY MASS INDEX: 35.01 KG/M2 | WEIGHT: 231 LBS | SYSTOLIC BLOOD PRESSURE: 111 MMHG | TEMPERATURE: 98.3 F

## 2025-04-30 DIAGNOSIS — R06.83 SNORING: ICD-10-CM

## 2025-04-30 DIAGNOSIS — B35.3 TINEA PEDIS OF BOTH FEET: ICD-10-CM

## 2025-04-30 DIAGNOSIS — D50.8 IRON DEFICIENCY ANEMIA SECONDARY TO INADEQUATE DIETARY IRON INTAKE: ICD-10-CM

## 2025-04-30 DIAGNOSIS — E66.812 OBESITY, CLASS II, BMI 35-39.9: ICD-10-CM

## 2025-04-30 DIAGNOSIS — Z00.00 ENCOUNTER FOR WELL ADULT EXAM WITHOUT ABNORMAL FINDINGS: ICD-10-CM

## 2025-04-30 DIAGNOSIS — B35.1 ONYCHOMYCOSIS: ICD-10-CM

## 2025-04-30 DIAGNOSIS — I10 PRIMARY HYPERTENSION: ICD-10-CM

## 2025-04-30 DIAGNOSIS — F41.9 ANXIETY: ICD-10-CM

## 2025-04-30 DIAGNOSIS — Z12.11 COLON CANCER SCREENING: ICD-10-CM

## 2025-04-30 DIAGNOSIS — Z12.31 ENCOUNTER FOR SCREENING MAMMOGRAM FOR MALIGNANT NEOPLASM OF BREAST: Primary | ICD-10-CM

## 2025-04-30 RX ORDER — TERBINAFINE HYDROCHLORIDE 250 MG/1
250 TABLET ORAL DAILY
Qty: 84 TABLET | Refills: 0 | Status: SHIPPED | OUTPATIENT
Start: 2025-04-30 | End: 2025-07-23

## 2025-04-30 RX ORDER — AMLODIPINE AND BENAZEPRIL HYDROCHLORIDE 10; 20 MG/1; MG/1
1 CAPSULE ORAL DAILY
Qty: 90 CAPSULE | Refills: 1 | Status: SHIPPED | OUTPATIENT
Start: 2025-04-30

## 2025-04-30 RX ORDER — ACETAMINOPHEN 500 MG
500 TABLET ORAL EVERY 6 HOURS PRN
COMMUNITY

## 2025-04-30 ASSESSMENT — PATIENT HEALTH QUESTIONNAIRE - PHQ9
2. FEELING DOWN, DEPRESSED OR HOPELESS: SEVERAL DAYS
SUM OF ALL RESPONSES TO PHQ QUESTIONS 1-9: 2
SUM OF ALL RESPONSES TO PHQ QUESTIONS 1-9: 2
1. LITTLE INTEREST OR PLEASURE IN DOING THINGS: SEVERAL DAYS
SUM OF ALL RESPONSES TO PHQ QUESTIONS 1-9: 2
SUM OF ALL RESPONSES TO PHQ QUESTIONS 1-9: 2

## 2025-04-30 NOTE — PROGRESS NOTES
Identified pt with two pt identifiers(name and ). Reviewed record in preparation for visit and have obtained necessary documentation.  Chief Complaint   Patient presents with    Annual Exam   Not fasting. Had coffee with cream this morning, but food. She also states she has been having chest pain to the center of the chest, that doesn't radiate. She also reports upper back pain. She states the chest pain and back pain started a week ago. 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  She states the chest pain has gotten better in the last day.     Health Maintenance Due   Topic    Hepatitis B vaccine (1 of 3 - 19+ 3-dose series)    DTaP/Tdap/Td vaccine (1 - Tdap)    Breast cancer screen     Colorectal Cancer Screen     COVID-19 Vaccine (3 - 2024-25 season)       Vitals:    25 0916   BP: 111/74   BP Site: Right Upper Arm   Patient Position: Sitting   BP Cuff Size: Large Adult   Pulse: 78   Resp: 18   Temp: 98.3 °F (36.8 °C)   TempSrc: Oral   SpO2: 95%   Weight: 104.8 kg (231 lb)   Height: 1.727 m (5' 8\")         \"Have you been to the ER, urgent care clinic since your last visit?  Hospitalized since your last visit?\"    Yes, 2024 [facility] ER Chest pain, headache     “Have you seen or consulted any other health care providers outside of [facility] since your last visit?”    NO    Have you had a mammogram?”   NO    No breast cancer screening on file         “Have you had a colorectal cancer screening such as a colonoscopy/FIT/Cologuard?    NO    No colonoscopy on file  No cologuard on file  No FIT/FOBT on file   No flexible sigmoidoscopy on file         Click Here for Release of Records Request     This patient is accompanied in the office by her self.  I have received verbal consent from Jacqui Gonzalez to discuss any/all medical information while they are present in the room.

## 2025-04-30 NOTE — PATIENT INSTRUCTIONS
Breathing technique: 4-7-8    Call [phone] and ask for central scheduling for radiology to schedule your mammogram.    Dr. Sams's office: Please call to schedule your colonoscopy.   Address: [address], [place] [address]  Phone: [phone]         Well Visit, Ages 18 to 65: Care Instructions  Well visits can help you stay healthy. Your doctor has checked your overall health and may have suggested ways to take good care of yourself. Your doctor also may have recommended tests. You can help prevent illness with healthy eating, good sleep, vaccinations, regular exercise, and other steps.    Get the tests that you and your doctor decide on. Depending on your age and risks, examples might include screening for diabetes; hepatitis C; HIV; and cervical, breast, lung, and colon cancer. Screening helps find diseases before any symptoms appear.   Eat healthy foods. Choose fruits, vegetables, whole grains, lean protein, and low-fat dairy foods. Limit saturated fat and reduce salt.     Limit alcohol. Men should have no more than 2 drinks a day. Women should have no more than 1. For some people, no alcohol is the best choice.   Exercise. Get at least 30 minutes of exercise on most days of the week. Walking can be a good choice.     Reach and stay at your healthy weight. This will lower your risk for many health problems.   Take care of your mental health. Try to stay connected with friends, family, and community, and find ways to manage stress.     If you're feeling depressed or hopeless, talk to someone. A counselor can help. If you don't have a counselor, talk to your doctor.   Talk to your doctor if you think you may have a problem with alcohol or drug use. This includes prescription medicines, marijuana, and other drugs.     Avoid tobacco and nicotine: Don't smoke, vape, or chew. If you need help quitting, talk to your doctor.   Practice safer sex. Getting tested, using condoms or dental dams, and limiting sex

## 2025-04-30 NOTE — PROGRESS NOTES
[id number] [id number]   Office [phone], Fax [phone]    Subjective:   Jacqui Gonzalez is an 44 y.o. female who presents for complete physical exam.  Doing well. No complaints.    #HM  CSY due  Mammo due  Pap UTD, NILM/HPV- 2024.    Diet: Eating more protein and fruits / veggies, broccoli, meal prepping, eggs, grits, eating at work (restaurant), eating fast food for dinner  Exercise: not working out, on feet 12 hrs per day; has weights at home / workout videos but hasn't done.    - SMART goal: on day off, will do workout video  Tobacco use: no  Alcohol use: 4x/month. One [place] ice tea or mimosa.    #Anxiety  - worse, state inspections at work this past week  - ok on current meds  - feels chest is heavy / tight from the anxiety throughout the week  - no chest pain at this time    #c/f Athletes foot  - yellow nail, right toenail has been present and worsening over the past year (after visiting a nail salon)  - has been using kerasal and lamisil in bottom of foot    #iron deficiency  - stopped taking iron supplements as it was making her nauseous  - trying to increase iron in diet  - generalized tiredness but otherwise feeling ok    #HTN  Ran out of meds, had to go to ER. Got refills  Needs refills today  BP today 111/74  STOP BANG score : 5, high    Health Maintenance   Topic Date Due    Hepatitis B vaccine (1 of 3 - 19+ 3-dose series) Never done    DTaP/Tdap/Td vaccine (1 - Tdap) Never done    Breast cancer screen  Never done    Colorectal Cancer Screen  Never done    COVID-19 Vaccine (3 - 2024-25 season) 09/01/2024    Flu vaccine (Season Ended) 08/17/2025 (Originally 8/1/2025)    Depression Monitoring  02/17/2026    Cervical cancer screen  02/22/2027    Lipids  02/22/2029    Hepatitis C screen  Completed    HIV screen  Completed    Hepatitis A vaccine  Aged Out    Hib vaccine  Aged Out    HPV vaccine  Aged Out    Polio vaccine  Aged Out    Meningococcal (ACWY) vaccine  Aged

## 2025-05-01 ENCOUNTER — RESULTS FOLLOW-UP (OUTPATIENT)
Age: 45
End: 2025-05-01

## 2025-05-01 DIAGNOSIS — D50.8 IRON DEFICIENCY ANEMIA SECONDARY TO INADEQUATE DIETARY IRON INTAKE: Primary | ICD-10-CM

## 2025-05-01 DIAGNOSIS — E55.9 VITAMIN D INSUFFICIENCY: ICD-10-CM

## 2025-05-01 LAB
25(OH)D3 SERPL-MCNC: 22.8 NG/ML (ref 30–100)
ALBUMIN SERPL-MCNC: 3.8 G/DL (ref 3.5–5)
ALBUMIN/GLOB SERPL: 0.9 (ref 1.1–2.2)
ALP SERPL-CCNC: 86 U/L (ref 45–117)
ALT SERPL-CCNC: 22 U/L (ref 12–78)
ANION GAP SERPL CALC-SCNC: 3 MMOL/L (ref 2–12)
AST SERPL-CCNC: 13 U/L (ref 15–37)
BILIRUB SERPL-MCNC: 0.6 MG/DL (ref 0.2–1)
BUN SERPL-MCNC: 12 MG/DL (ref 6–20)
BUN/CREAT SERPL: 17 (ref 12–20)
CALCIUM SERPL-MCNC: 9.5 MG/DL (ref 8.5–10.1)
CHLORIDE SERPL-SCNC: 104 MMOL/L (ref 97–108)
CHOLEST SERPL-MCNC: 176 MG/DL
CO2 SERPL-SCNC: 33 MMOL/L (ref 21–32)
CREAT SERPL-MCNC: 0.71 MG/DL (ref 0.55–1.02)
ERYTHROCYTE [DISTWIDTH] IN BLOOD BY AUTOMATED COUNT: 22.9 % (ref 11.5–14.5)
FERRITIN SERPL-MCNC: 6 NG/ML (ref 26–388)
FOLATE SERPL-MCNC: 20.5 NG/ML (ref 5–21)
GLOBULIN SER CALC-MCNC: 4.3 G/DL (ref 2–4)
GLUCOSE SERPL-MCNC: 87 MG/DL (ref 65–100)
HCT VFR BLD AUTO: 36.2 % (ref 35–47)
HDLC SERPL-MCNC: 58 MG/DL
HDLC SERPL: 3 (ref 0–5)
HGB BLD-MCNC: 10.9 G/DL (ref 11.5–16)
IRON SATN MFR SERPL: 4 % (ref 20–50)
IRON SERPL-MCNC: 18 UG/DL (ref 35–150)
LDLC SERPL CALC-MCNC: 102.4 MG/DL (ref 0–100)
MCH RBC QN AUTO: 21.9 PG (ref 26–34)
MCHC RBC AUTO-ENTMCNC: 30.1 G/DL (ref 30–36.5)
MCV RBC AUTO: 72.8 FL (ref 80–99)
NRBC # BLD: 0 K/UL (ref 0–0.01)
NRBC BLD-RTO: 0 PER 100 WBC
PLATELET # BLD AUTO: 303 K/UL (ref 150–400)
PMV BLD AUTO: 10.4 FL (ref 8.9–12.9)
POTASSIUM SERPL-SCNC: 4.1 MMOL/L (ref 3.5–5.1)
PROT SERPL-MCNC: 8.1 G/DL (ref 6.4–8.2)
RBC # BLD AUTO: 4.97 M/UL (ref 3.8–5.2)
SODIUM SERPL-SCNC: 140 MMOL/L (ref 136–145)
TIBC SERPL-MCNC: 432 UG/DL (ref 250–450)
TRIGL SERPL-MCNC: 78 MG/DL
TSH SERPL DL<=0.05 MIU/L-ACNC: 1.61 UIU/ML (ref 0.36–3.74)
VIT B12 SERPL-MCNC: 718 PG/ML (ref 193–986)
VLDLC SERPL CALC-MCNC: 15.6 MG/DL
WBC # BLD AUTO: 5.8 K/UL (ref 3.6–11)

## 2025-05-01 RX ORDER — FERROUS GLUCONATE 324(38)MG
324 TABLET ORAL EVERY OTHER DAY
Qty: 45 TABLET | Refills: 3 | Status: SHIPPED | OUTPATIENT
Start: 2025-05-01

## 2025-05-08 NOTE — PROGRESS NOTES
[facility] Attending Attestation: I have seen and evaluated the patient, repeating/performing the critical or key elements of the service. I discussed the findings, assessment and plan with the resident and agree with the resident's documentation.    Eduardo Ramos MD, MPH  [facility]

## 2025-08-26 ENCOUNTER — OFFICE VISIT (OUTPATIENT)
Age: 45
End: 2025-08-26
Payer: COMMERCIAL

## 2025-08-26 VITALS
HEART RATE: 79 BPM | DIASTOLIC BLOOD PRESSURE: 83 MMHG | BODY MASS INDEX: 35.37 KG/M2 | HEIGHT: 68 IN | WEIGHT: 233.4 LBS | TEMPERATURE: 97.8 F | OXYGEN SATURATION: 95 % | SYSTOLIC BLOOD PRESSURE: 123 MMHG

## 2025-08-26 DIAGNOSIS — G47.33 OSA (OBSTRUCTIVE SLEEP APNEA): Primary | ICD-10-CM

## 2025-08-26 PROBLEM — E66.01 MORBID (SEVERE) OBESITY DUE TO EXCESS CALORIES (HCC): Status: ACTIVE | Noted: 2025-08-26

## 2025-08-26 PROCEDURE — 99203 OFFICE O/P NEW LOW 30 MIN: CPT | Performed by: SPECIALIST

## 2025-08-26 ASSESSMENT — SLEEP AND FATIGUE QUESTIONNAIRES
HOW LIKELY ARE YOU TO NOD OFF OR FALL ASLEEP WHILE LYING DOWN TO REST IN THE AFTERNOON WHEN CIRCUMSTANCES PERMIT: HIGH CHANCE OF DOZING
HOW LIKELY ARE YOU TO NOD OFF OR FALL ASLEEP IN A CAR, WHILE STOPPED FOR A FEW MINUTES IN TRAFFIC: WOULD NEVER DOZE
HOW LIKELY ARE YOU TO NOD OFF OR FALL ASLEEP WHILE SITTING AND TALKING TO SOMEONE: WOULD NEVER DOZE
HOW LIKELY ARE YOU TO NOD OFF OR FALL ASLEEP WHILE SITTING QUIETLY AFTER LUNCH WITHOUT ALCOHOL: SLIGHT CHANCE OF DOZING
HOW LIKELY ARE YOU TO NOD OFF OR FALL ASLEEP WHILE WATCHING TV: MODERATE CHANCE OF DOZING
ESS TOTAL SCORE: 9
HOW LIKELY ARE YOU TO NOD OFF OR FALL ASLEEP WHEN YOU ARE A PASSENGER IN A CAR FOR AN HOUR WITHOUT A BREAK: SLIGHT CHANCE OF DOZING
HOW LIKELY ARE YOU TO NOD OFF OR FALL ASLEEP WHILE SITTING INACTIVE IN A PUBLIC PLACE: SLIGHT CHANCE OF DOZING
HOW LIKELY ARE YOU TO NOD OFF OR FALL ASLEEP WHILE SITTING AND READING: SLIGHT CHANCE OF DOZING